# Patient Record
Sex: MALE | Race: WHITE | NOT HISPANIC OR LATINO | Employment: OTHER | URBAN - METROPOLITAN AREA
[De-identification: names, ages, dates, MRNs, and addresses within clinical notes are randomized per-mention and may not be internally consistent; named-entity substitution may affect disease eponyms.]

---

## 2018-08-07 ENCOUNTER — OFFICE VISIT (OUTPATIENT)
Dept: ORTHOPEDIC SURGERY | Facility: CLINIC | Age: 53
End: 2018-08-07

## 2018-08-07 VITALS — HEIGHT: 70 IN | OXYGEN SATURATION: 97 % | BODY MASS INDEX: 30.93 KG/M2 | WEIGHT: 216.05 LBS | HEART RATE: 58 BPM

## 2018-08-07 DIAGNOSIS — M25.531 RIGHT WRIST PAIN: Primary | ICD-10-CM

## 2018-08-07 DIAGNOSIS — M18.11 ARTHRITIS OF CARPOMETACARPAL (CMC) JOINT OF RIGHT THUMB: ICD-10-CM

## 2018-08-07 DIAGNOSIS — G56.03 BILATERAL CARPAL TUNNEL SYNDROME: ICD-10-CM

## 2018-08-07 PROCEDURE — 99203 OFFICE O/P NEW LOW 30 MIN: CPT | Performed by: PHYSICIAN ASSISTANT

## 2018-08-07 PROCEDURE — 20526 THER INJECTION CARP TUNNEL: CPT | Performed by: PHYSICIAN ASSISTANT

## 2018-08-07 RX ORDER — METOPROLOL TARTRATE 100 MG/1
TABLET ORAL
Refills: 5 | COMMUNITY
Start: 2018-07-11

## 2018-08-07 RX ORDER — LOSARTAN POTASSIUM 50 MG
TABLET ORAL
Refills: 5 | COMMUNITY
Start: 2018-07-11

## 2018-08-07 RX ORDER — OMEPRAZOLE 40 MG/1
CAPSULE, DELAYED RELEASE ORAL
COMMUNITY
Start: 2018-07-11

## 2018-08-07 RX ADMIN — BETAMETHASONE SODIUM PHOSPHATE AND BETAMETHASONE ACETATE 6 MG: 3; 3 INJECTION, SUSPENSION INTRA-ARTICULAR; INTRALESIONAL; INTRAMUSCULAR; SOFT TISSUE at 15:27

## 2018-08-07 RX ADMIN — LIDOCAINE HYDROCHLORIDE 1 ML: 10 INJECTION, SOLUTION INFILTRATION; PERINEURAL at 15:27

## 2018-08-07 NOTE — PROGRESS NOTES
Procedure   Carpral Tunnel injection  Date/Time: 8/7/2018 3:27 PM  Consent given by: patient  Site marked: site marked  Timeout: Immediately prior to procedure a time out was called to verify the correct patient, procedure, equipment, support staff and site/side marked as required   Supporting Documentation  Indications: pain   Procedure Details  Location: wrist - Wrist joint: Right Carpal Tunnel.  Preparation: Patient was prepped and draped in the usual sterile fashion  Needle size: 25 G  Approach: volar  Medications administered: 1 mL lidocaine 1 %; 6 mg betamethasone acetate-betamethasone sodium phosphate 6 (3-3) MG/ML  Patient tolerance: patient tolerated the procedure well with no immediate complications

## 2018-08-07 NOTE — PROGRESS NOTES
[facility] [place]    Subjective     Pain of the Right Wrist      HPI    Juan José Lang is a 53 y.o. male.  Right-hand-dominant.  Patient presents to orthopedic clinic for evaluation of right wrist pain with numbness in thumb through long finger.  He states that 16 years ago he had a right carpal tunnel release performed the [place] [facility] [place].  Of significance almost 30 years ago in [place], [place] he had a left carpal tunnel release.  With this right wrist pain this is been ongoing for months only getting worse.  He describes pain scale 8/10.  Severity the pain moderate to severe.  Quality of pain burning and sharp.  He does wear wrist splint at night to help assist with symptoms but they do not allow him to get much sleep.  He's had no other treatment since having the carpal tunnel release for this.  At this time he denies any other radiculopathy or paresthesias.  No reported fever, chills, night sweats or other constitutional symptoms.  He does report some mild return of numbness-like symptoms in his left but no significant pain or discomfort.    Past Medical History:   Diagnosis Date   • H/O blood clots    • Osteoarthritis       Past Surgical History:   Procedure Laterality Date   • KNEE SURGERY Bilateral    • WRIST SURGERY Bilateral       Family History   Problem Relation Age of Onset   • Cancer Mother      Social History     Social History   • Marital status:      Spouse name: N/A   • Number of children: N/A   • Years of education: N/A     Occupational History   • Not on file.     Social History Main Topics   • Smoking status: Never Smoker   • Smokeless tobacco: Current User   • Alcohol use 12.6 oz/week     21 Cans of beer per week   • Drug use: No   • Sexual activity: Defer     Other Topics Concern   • Not on file     Social History Narrative   • No narrative on file      No current outpatient prescriptions on file prior to visit.     No current facility-administered medications on  "file prior to visit.       No Known Allergies     The following portions of the patient's history were reviewed and updated as appropriate: allergies, current medications, past family history, past medical history, past social history, past surgical history and problem list.    Review of Systems   Constitutional: Negative.    HENT: Negative.    Eyes: Negative.    Respiratory: Negative.    Cardiovascular: Negative.    Gastrointestinal: Negative.    Endocrine: Negative.    Genitourinary: Negative.    Musculoskeletal: Positive for arthralgias.   Skin: Negative.    Allergic/Immunologic: Negative.    Neurological: Positive for numbness.   Hematological: Negative.         Blood clots   Psychiatric/Behavioral: Negative.         Objective      Physical Exam  Pulse 58   Ht 179 cm (70.47\")   Wt 98 kg (216 lb 0.8 oz)   SpO2 97%   BMI 30.59 kg/m²     Body mass index is 30.59 kg/m².    General  Mental Status - alert  General Appearance - cooperative, well groomed, not in acute distress  Orientation - Oriented X3  Build & Nutrition - well developed and well nourished  Posture - normal posture  Gait - normal gait     Integumentary  Global Assessment  Examination of related systems reveals - no lymphadenopathy  General Characteristics  Overall examination of the patient's skin reveals - no rashes, no evidence of scars, no suspicious lesions and no bruises.  Color - normal coloration of skin.    Ortho Exam  Peripheral Vascular   Bilateral Upper Extremity    No cyanotic nail beds    Pink nail beds and rapid capillary refill   Palpation    Radial Pulse - Bilaterally normal    Neurologic   Sensory: Light touch intact- Right and left hand    Left Upper Extremity    Left wrist extensors: 5/5    Left wrist flexors: 5/5    Left intrinsics: 5/5      Right Upper Extremity    Right wrist extensors: 5/5    Right wrist flexors: 5/5    Right intrinsics: 5/5    Musculoskeletal   Left Elbow    Forearm supination: AROM - 90 degrees    Forearm " pronation: AROM - 90 degrees   Right Elbow    Forearm supination: AROM - 90 degrees    Forearm pronation: AROM - 90 degrees     Inspection and Palpation   Right Wrist      Tenderness - none    Swelling - none    Crepitus - none    Muscle tone - no atrophy   Left Wrist    Tenderness - none    Swelling - none    Crepitus - none    Muscle tone - no atrophy     ROJM:   Left Wrist    Flexion: AROM - 90 degrees    Extension: AROM - 90 degrees   Right Wrist    Flexion: AROM - 90 degrees    Extension: AROM - 90 degrees     Deformities, Malalignments, Discrepancies    None     Functional Testing   Right    Tinel's Sign-- negative    Phalen's Sign--positive    Carpal Compression Test--positive    Thenar wasting--none    APB--5/5    Elbow Flexion test--negative    Cubital tunnel signs--negative      Finklestein's:  Negative    CMC shuck:  Positive    CMC grind:  Positive, mild    CMC tenderness: Positive, mild    A1 pulley:  Negative     Left     Tinel's Sign--negative    Phalen's Sign--positive    Carpal Compression Test-- negative    Thenar Wasting--none    APB--5/5    Elbow Flexion test--negative    Cubital tunnel signs--negative      Finklestein's:  Negative    CMC shuck:  Negative    CMC grind:  Negative    CMC tenderness: Negative    A1 pulley:  Negative      Strength and Tone    Right  strength: good    Left  strength: good      Imaging/Studies  Imaging Results (last 24 hours)     ** No results found for the last 24 hours. **      Ordered 3 views of the right wrist.  Images reviewed by Dr. Burns.  No acute bony abnormality however he does have evidence of moderate to severe first CMC joint arthritis with significant joint erosion.  Mild arthritic changes at the radiocarpal joint.  See chart for official report.    Assessment:  1. Right wrist pain    2. Bilateral carpal tunnel syndrome    3. Arthritis of carpometacarpal (CMC) joint of right thumb        Plan:  1. Discussion was had with patient regarding exam,  imaging, assessment and treatment options.  2. Offered and accepted corticosteroid injection to the right carpal tunnel to help assist with pain control.  Injection was given today.    3. Continue use of his wrist splint at night to help with his nighttime symptoms.    4. EMG/NCS ordered today for further evaluation.    5. Recommend use of over-the-counter medication as needed for discomfort.  6. For follow-up after the studies completed to discuss results and treatment options.  Due to the fact that he does have moderate to severe CMC arthritis to that same side there is a possibility of sending him to a hand surgeon for discussion of CMC arthroplasty as well as revised carpal tunnel release.  7. Question and concerns answered.    After discussing the risks of injection, the patient gave consent to proceed.  His right wrist carpal tunnel was confirmed as the correct site to be injected with a timeout.  It was then prepped using Hibiclens and injected with a mixture of 1 cc of 1% plain lidocaine and 1 cc of Celestone without any resistance through the carpal tunnel, patient in seated position.  Area was cleaned, hemostasis was achieved and a Band-Aid was applied over the injection site.  The patient tolerated procedure well.  I instructed the patient on signs and symptoms of infection.  They should report to the emergency department or return to clinic if any of these develop, for further evaluation and treatment.  Recommended modifying activity for the next 48 hours to include rest, ice, elevation and oral pain medication as needed.      Medical Decision Making  Management Options : over-the-counter medicine, injection  Data/Risk: radiology tests    Annamarie Pool PA-C  08/10/18  8:22 AM

## 2018-08-10 RX ORDER — BETAMETHASONE SODIUM PHOSPHATE AND BETAMETHASONE ACETATE 3; 3 MG/ML; MG/ML
6 INJECTION, SUSPENSION INTRA-ARTICULAR; INTRALESIONAL; INTRAMUSCULAR; SOFT TISSUE
Status: COMPLETED | OUTPATIENT
Start: 2018-08-07 | End: 2018-08-07

## 2018-08-10 RX ORDER — LIDOCAINE HYDROCHLORIDE 10 MG/ML
1 INJECTION, SOLUTION INFILTRATION; PERINEURAL
Status: COMPLETED | OUTPATIENT
Start: 2018-08-07 | End: 2018-08-07

## 2018-08-16 ENCOUNTER — TELEPHONE (OUTPATIENT)
Dept: ORTHOPEDIC SURGERY | Facility: CLINIC | Age: 53
End: 2018-08-16

## 2018-08-16 ENCOUNTER — HOSPITAL ENCOUNTER (OUTPATIENT)
Dept: NEUROLOGY | Facility: HOSPITAL | Age: 53
Discharge: HOME | End: 2018-08-16
Admitting: PHYSICIAN ASSISTANT

## 2018-08-16 DIAGNOSIS — G56.03 BILATERAL CARPAL TUNNEL SYNDROME: ICD-10-CM

## 2018-08-16 DIAGNOSIS — M25.531 RIGHT WRIST PAIN: ICD-10-CM

## 2018-08-16 PROCEDURE — 95910 NRV CNDJ TEST 7-8 STUDIES: CPT

## 2018-08-16 PROCEDURE — 95886 MUSC TEST DONE W/N TEST COMP: CPT

## 2018-08-16 NOTE — TELEPHONE ENCOUNTER
I spoke with Mr. Lang let him know that Annamarie  wants him to come in when Dr. Landaverde is here so he is scheduled 8/21 to discuss his results.

## 2018-08-21 ENCOUNTER — OFFICE VISIT (OUTPATIENT)
Dept: ORTHOPEDIC SURGERY | Facility: CLINIC | Age: 53
End: 2018-08-21

## 2018-08-21 DIAGNOSIS — M18.11 ARTHRITIS OF CARPOMETACARPAL (CMC) JOINT OF RIGHT THUMB: ICD-10-CM

## 2018-08-21 DIAGNOSIS — R20.2 NUMBNESS AND TINGLING IN RIGHT HAND: ICD-10-CM

## 2018-08-21 DIAGNOSIS — M25.531 RIGHT WRIST PAIN: Primary | ICD-10-CM

## 2018-08-21 DIAGNOSIS — G56.03 BILATERAL CARPAL TUNNEL SYNDROME: ICD-10-CM

## 2018-08-21 DIAGNOSIS — R20.0 NUMBNESS AND TINGLING IN RIGHT HAND: ICD-10-CM

## 2018-08-21 PROCEDURE — 99213 OFFICE O/P EST LOW 20 MIN: CPT | Performed by: PHYSICIAN ASSISTANT

## 2018-08-21 RX ORDER — LOSARTAN POTASSIUM 50 MG/1
TABLET ORAL
Refills: 5 | COMMUNITY
Start: 2018-08-12

## 2018-08-21 NOTE — PROGRESS NOTES
[facility] [place] [facility]    Subjective     CC: Follow-up of the Right Wrist (2 weeks post EMG 08/16/18)      LEODAN Lang is a 53 y.o. male.  Patient returns today to review EMG/NCS study as well as discuss benefit from corticosteroid injection that he was given during his initial appointment.  He reports he continues to have pain and now swelling throughout his hand.  The injection that he was given during his first visit provided a couple of days of relief but only at a level of 30-40%.  He continues to endorse pain scale 8/10.  Severity the pain is moderate to severe.  Quality the pain is burning sharp.  He localizes pain to the thumb through middle finger as well as numbness into these digits.  States she is unable to sleep secondary to the pain.  Been wearing a cockup wrist splint without much benefit.  As previously stated the corticosteroid injection is given to the carpal tunnel he provided a very minimal results.    Reports that the numbness is so intense in his hand that if he was to cut off the digits he would never feel it.    He states that 3 weeks ago he was treated with oral steroids as well as intramuscular injection but he had a reaction to the medication so he can no longer take oral steroids.  Plus he states he had no significant benefit from receiving these 2 medications.      ROS:    Constiutional:Pt denies fever, chills, nausea, or vomiting.  MSK:as above    Objective      Past Medical History  Past Medical History:   Diagnosis Date   • H/O blood clots    • Osteoarthritis          Physical Exam  There were no vitals taken for this visit.    There is no height or weight on file to calculate BMI.    Patient is well nourished and well developed.        Ortho Exam  Peripheral Vascular              Bilateral Upper Extremity                          No cyanotic nail beds                          Pink nail beds and rapid capillary refill              Palpation                           Radial Pulse - Bilaterally normal     Neurologic              Sensory: Light touch intact- Right and left hand                  Right Upper Extremity                          Right wrist extensors: 5/5                          Right wrist flexors: 5/5                          Right intrinsics: 5/5     Musculoskeletal              Right Elbow                          Forearm supination: AROM - 90 degrees                          Forearm pronation: AROM - 90 degrees                 Inspection and Palpation              Right Wrist                               Tenderness - positive throughout wrist to include thumb through middle finger                          Swelling - positive dorsum wrist and hand                          Crepitus - none                          Muscle tone - no atrophy                ROJM:              Right Wrist                          Flexion: AROM - 90 degrees                          Extension: AROM - 90 degrees                 Deformities, Malalignments, Discrepancies                          None                 Functional Testing              Right                          Tinel's Sign-- negative                          Phalen's Sign--positive                          Carpal Compression Test--positive                          Thenar wasting--none                          APB--5/5                          Elbow Flexion test--negative                          Cubital tunnel signs--negative                             Finklestein's:  Negative                          CMC shuck:  Positive                          CMC grind:  Positive, mild                          CMC tenderness: Positive, mild                          A1 pulley:  Negative                 Left                           Tinel's Sign--negative                          Phalen's Sign--positive                          Carpal Compression Test-- negative                          Thenar Wasting--none                           APB--5/5                          Elbow Flexion test--negative                          Cubital tunnel signs--negative                             Finklestein's:  Negative                          CMC shuck:  Negative                          CMC grind:  Negative                          CMC tenderness: Negative                          A1 pulley:  Negative                 Strength and Tone                          Right  strength: good                          Left  strength: good    C-spine exam  Full range of motion without any restrictions or limitations.  Spurling's test negative.       Imaging/Labs/EMG Reviewed:  Imaging Results (last 24 hours)     ** No results found for the last 24 hours. **      Ordered plain film imaging of the C-spine today 2 views.  Images reviewed by Dr. Landaverde.  No acute bony abnormality, fracture or dislocation.  Soft tissues appear normal.  Normal disc spaces.  See chart for official report.    EMG/NCS performed on 8/16/18 was reviewed.  Showed motor latency on the right 4.5, left 4.1; indicating mild to moderate median neuropathy on right and mild on left.  He also had evidence of ulnar neuropathy bilaterally mild to moderate.    Assessment:  1. Right wrist pain    2. Bilateral carpal tunnel syndrome    3. Arthritis of carpometacarpal (CMC) joint of right thumb    4. Numbness and tingling in right hand        Plan:  1. Discussion was had with patient regarding exam, imaging, assessment and treatment options.  2. Patient is frustrated at this point stating he knows it's carpal tunnel and wishes somebody would just do something about it.  But based on his findings with numbness to dorsal and volar aspect thumb through middle finger as well as first CMC arthritis and hand swelling and very limited response to the steroid injection, Dr. Landaverde and I do not believe that performing only carpal tunnel release would resolve all of his symptoms.  3. He would also like MRI of  the C-spine to verify that there is no cervical pathology causing his symptoms despite plain film imaging being negative.  4. I will go on and place a referral to a hand surgeon for further evaluation and definitive treatment as indicated for the patient's recurrent carpal tunnel symptoms as well as evaluation of the first CMC arthritis.  5. Patient does understand that his his MRI does show pathology to the cervical spine he would need to be evaluated by neurosurgery as well.  6. Continue use of his wrist splint at night to help with his nighttime symptoms.    7. Recommend use of over-the-counter medication as needed for discomfort.  If he requires stronger medication will be to consult his PCP.  8. Follow-up in our clinic on an as-needed basis.  Patient has been referred for MRI as well as to hand surgery for evaluation and definitive treatment as indicated.    9. Question and concerns answered.      Medical Decision Making  Management Options : over-the-counter medicine  Data/Risk: radiology tests, EMG/NCS tests    Annamarie Pool PA-C  08/24/18  9:12 AM

## 2018-09-10 ENCOUNTER — HOSPITAL ENCOUNTER (OUTPATIENT)
Dept: MRI IMAGING | Facility: HOSPITAL | Age: 53
Discharge: HOME | End: 2018-09-10
Admitting: PHYSICIAN ASSISTANT

## 2018-09-10 DIAGNOSIS — R20.2 NUMBNESS AND TINGLING IN RIGHT HAND: ICD-10-CM

## 2018-09-10 DIAGNOSIS — R20.0 NUMBNESS AND TINGLING IN RIGHT HAND: ICD-10-CM

## 2018-09-10 PROCEDURE — 72141 MRI NECK SPINE W/O DYE: CPT

## 2018-09-12 ENCOUNTER — TELEPHONE (OUTPATIENT)
Dept: ORTHOPEDIC SURGERY | Facility: CLINIC | Age: 53
End: 2018-09-12

## 2018-09-13 NOTE — TELEPHONE ENCOUNTER
Patient was provided the results of his c-spine MRI performed on 9/10/2018.  Mild degenerative changes with mild central stenosis C5-C6.  No foraminal stenosis.    Patient has evaluation by Hand Surgeon next week.

## 2018-09-17 ENCOUNTER — LAB (OUTPATIENT)
Dept: LAB | Facility: HOSPITAL | Age: 53
End: 2018-09-17

## 2018-09-17 ENCOUNTER — TRANSCRIBE ORDERS (OUTPATIENT)
Dept: LAB | Facility: HOSPITAL | Age: 53
End: 2018-09-17

## 2018-09-17 DIAGNOSIS — M25.431 SWOLLEN WRIST, RIGHT: ICD-10-CM

## 2018-09-17 DIAGNOSIS — M25.431 SWOLLEN WRIST, RIGHT: Primary | ICD-10-CM

## 2018-09-17 LAB
CRP SERPL-MCNC: 0.2 MG/DL (ref 0–1)
ERYTHROCYTE [SEDIMENTATION RATE] IN BLOOD: 20 MM/HR (ref 0–20)
RHEUMATOID FACT SERPL-ACNC: NEGATIVE [IU]/ML
URATE SERPL-MCNC: 7.3 MG/DL (ref 3.7–9.2)

## 2018-09-17 PROCEDURE — 86430 RHEUMATOID FACTOR TEST QUAL: CPT

## 2018-09-17 PROCEDURE — 36415 COLL VENOUS BLD VENIPUNCTURE: CPT | Performed by: ORTHOPAEDIC SURGERY

## 2018-09-17 PROCEDURE — 85652 RBC SED RATE AUTOMATED: CPT

## 2018-09-17 PROCEDURE — 86038 ANTINUCLEAR ANTIBODIES: CPT

## 2018-09-17 PROCEDURE — 86200 CCP ANTIBODY: CPT

## 2018-09-17 PROCEDURE — 84550 ASSAY OF BLOOD/URIC ACID: CPT

## 2018-09-17 PROCEDURE — 86140 C-REACTIVE PROTEIN: CPT | Performed by: ORTHOPAEDIC SURGERY

## 2018-09-18 LAB — ANA SER QL: NEGATIVE

## 2018-09-19 LAB — CCP IGA+IGG SERPL IA-ACNC: 9 UNITS (ref 0–19)

## 2018-09-21 ENCOUNTER — APPOINTMENT (OUTPATIENT)
Dept: NEUROLOGY | Facility: HOSPITAL | Age: 53
End: 2018-09-21